# Patient Record
Sex: FEMALE | Race: WHITE | NOT HISPANIC OR LATINO | Employment: FULL TIME | URBAN - METROPOLITAN AREA
[De-identification: names, ages, dates, MRNs, and addresses within clinical notes are randomized per-mention and may not be internally consistent; named-entity substitution may affect disease eponyms.]

---

## 2018-01-13 NOTE — MISCELLANEOUS
Message  I called patient after receiving a referral from [facility] patient stated she is not interest in surgery and told them that could not understand why she was referred        Signatures   Electronically signed by : Krishna Krishnamurthy, ; Dec 28 2016  9:47AM EST                       (Author)

## 2022-05-24 ENCOUNTER — APPOINTMENT (EMERGENCY)
Dept: CT IMAGING | Facility: HOSPITAL | Age: 47
End: 2022-05-24
Payer: COMMERCIAL

## 2022-05-24 ENCOUNTER — HOSPITAL ENCOUNTER (EMERGENCY)
Facility: HOSPITAL | Age: 47
Discharge: HOME | End: 2022-05-24
Attending: EMERGENCY MEDICINE
Payer: COMMERCIAL

## 2022-05-24 VITALS
SYSTOLIC BLOOD PRESSURE: 129 MMHG | DIASTOLIC BLOOD PRESSURE: 62 MMHG | OXYGEN SATURATION: 97 % | WEIGHT: 293 LBS | TEMPERATURE: 99.2 F | RESPIRATION RATE: 18 BRPM | HEART RATE: 59 BPM

## 2022-05-24 DIAGNOSIS — R10.9 LEFT FLANK PAIN: Primary | ICD-10-CM

## 2022-05-24 LAB
ANION GAP SERPL CALCULATED.3IONS-SCNC: 12 MMOL/L (ref 4–13)
BASOPHILS # BLD MANUAL: 0 THOUSAND/UL (ref 0–0.1)
BASOPHILS NFR MAR MANUAL: 0 % (ref 0–1)
BILIRUB UR QL STRIP: NEGATIVE
BUN SERPL-MCNC: 15 MG/DL (ref 5–25)
CALCIUM SERPL-MCNC: 9.5 MG/DL (ref 8.3–10.1)
CHLORIDE SERPL-SCNC: 104 MMOL/L (ref 100–108)
CLARITY UR: CLEAR
CO2 SERPL-SCNC: 25 MMOL/L (ref 21–32)
COLOR UR: YELLOW
CREAT SERPL-MCNC: 0.8 MG/DL (ref 0.6–1.3)
EOSINOPHIL # BLD MANUAL: 0.14 THOUSAND/UL (ref 0–0.4)
EOSINOPHIL NFR BLD MANUAL: 1 % (ref 0–6)
ERYTHROCYTE [DISTWIDTH] IN BLOOD BY AUTOMATED COUNT: 14.9 % (ref 11.6–15.1)
EXT PREG TEST URINE: NEGATIVE
EXT. CONTROL ED NAV: NORMAL
GFR SERPL CREATININE-BSD FRML MDRD: 88 ML/MIN/1.73SQ M
GLUCOSE SERPL-MCNC: 108 MG/DL (ref 65–140)
GLUCOSE UR STRIP-MCNC: NEGATIVE MG/DL
HCT VFR BLD AUTO: 37.9 % (ref 34.8–46.1)
HGB BLD-MCNC: 12 G/DL (ref 11.5–15.4)
HGB UR QL STRIP.AUTO: NEGATIVE
KETONES UR STRIP-MCNC: NEGATIVE MG/DL
LEUKOCYTE ESTERASE UR QL STRIP: NEGATIVE
LYMPHOCYTES # BLD AUTO: 41 % (ref 14–44)
LYMPHOCYTES # BLD AUTO: 5.79 THOUSAND/UL (ref 0.6–4.47)
MCH RBC QN AUTO: 26 PG (ref 26.8–34.3)
MCHC RBC AUTO-ENTMCNC: 31.7 G/DL (ref 31.4–37.4)
MCV RBC AUTO: 82 FL (ref 82–98)
MONOCYTES # BLD AUTO: 1.27 THOUSAND/UL (ref 0–1.22)
MONOCYTES NFR BLD: 9 % (ref 4–12)
NEUTROPHILS # BLD MANUAL: 6.92 THOUSAND/UL (ref 1.85–7.62)
NEUTS SEG NFR BLD AUTO: 49 % (ref 43–75)
NITRITE UR QL STRIP: NEGATIVE
PH UR STRIP.AUTO: 5.5 [PH] (ref 4.5–8)
PLATELET # BLD AUTO: 386 THOUSANDS/UL (ref 149–390)
PLATELET BLD QL SMEAR: ADEQUATE
PMV BLD AUTO: 9.4 FL (ref 8.9–12.7)
POTASSIUM SERPL-SCNC: 3.5 MMOL/L (ref 3.5–5.3)
PROT UR STRIP-MCNC: NEGATIVE MG/DL
RBC # BLD AUTO: 4.62 MILLION/UL (ref 3.81–5.12)
RBC MORPH BLD: NORMAL
SODIUM SERPL-SCNC: 141 MMOL/L (ref 136–145)
SP GR UR STRIP.AUTO: >=1.03 (ref 1–1.03)
UROBILINOGEN UR QL STRIP.AUTO: 0.2 E.U./DL
WBC # BLD AUTO: 14.13 THOUSAND/UL (ref 4.31–10.16)

## 2022-05-24 PROCEDURE — 99284 EMERGENCY DEPT VISIT MOD MDM: CPT | Performed by: PHYSICIAN ASSISTANT

## 2022-05-24 PROCEDURE — 85007 BL SMEAR W/DIFF WBC COUNT: CPT | Performed by: PHYSICIAN ASSISTANT

## 2022-05-24 PROCEDURE — G1004 CDSM NDSC: HCPCS

## 2022-05-24 PROCEDURE — 74176 CT ABD & PELVIS W/O CONTRAST: CPT

## 2022-05-24 PROCEDURE — 36415 COLL VENOUS BLD VENIPUNCTURE: CPT | Performed by: PHYSICIAN ASSISTANT

## 2022-05-24 PROCEDURE — 81025 URINE PREGNANCY TEST: CPT | Performed by: PHYSICIAN ASSISTANT

## 2022-05-24 PROCEDURE — 85027 COMPLETE CBC AUTOMATED: CPT | Performed by: PHYSICIAN ASSISTANT

## 2022-05-24 PROCEDURE — 99284 EMERGENCY DEPT VISIT MOD MDM: CPT

## 2022-05-24 PROCEDURE — 85025 COMPLETE CBC W/AUTO DIFF WBC: CPT | Performed by: PHYSICIAN ASSISTANT

## 2022-05-24 PROCEDURE — 81003 URINALYSIS AUTO W/O SCOPE: CPT

## 2022-05-24 PROCEDURE — 80048 BASIC METABOLIC PNL TOTAL CA: CPT | Performed by: PHYSICIAN ASSISTANT

## 2022-05-24 PROCEDURE — 96374 THER/PROPH/DIAG INJ IV PUSH: CPT

## 2022-05-24 RX ORDER — LIDOCAINE 40 MG/G
CREAM TOPICAL AS NEEDED
Qty: 30 G | Refills: 0 | Status: SHIPPED | OUTPATIENT
Start: 2022-05-24

## 2022-05-24 RX ORDER — LIDOCAINE 50 MG/G
1 PATCH TOPICAL ONCE
Status: DISCONTINUED | OUTPATIENT
Start: 2022-05-24 | End: 2022-05-25 | Stop reason: HOSPADM

## 2022-05-24 RX ORDER — KETOROLAC TROMETHAMINE 30 MG/ML
30 INJECTION, SOLUTION INTRAMUSCULAR; INTRAVENOUS ONCE
Status: COMPLETED | OUTPATIENT
Start: 2022-05-24 | End: 2022-05-24

## 2022-05-24 RX ORDER — METHOCARBAMOL 500 MG/1
500 TABLET, FILM COATED ORAL 3 TIMES DAILY
Qty: 20 TABLET | Refills: 0 | Status: SHIPPED | OUTPATIENT
Start: 2022-05-24

## 2022-05-24 RX ADMIN — LIDOCAINE 1 PATCH: 50 PATCH CUTANEOUS at 20:43

## 2022-05-24 RX ADMIN — KETOROLAC TROMETHAMINE 30 MG: 30 INJECTION, SOLUTION INTRAMUSCULAR at 20:41

## 2022-05-24 NOTE — Clinical Note
Carol J Carlos was seen and treated in our emergency department on 5/24/2022  Diagnosis:     Ruben Walls  may return to work on return date  She may return on this date: 05/25/2022         If you have any questions or concerns, please don't hesitate to call        Debbie Mckeon PA-C    ______________________________           _______________          _______________  Hospital Representative                              Date                                Time

## 2022-05-24 NOTE — Clinical Note
Yola Quezada was seen and treated in our emergency department on 5/24/2022  Diagnosis:     Sanchez Andrews  may return to work on return date  She may return on this date: 05/27/2022         If you have any questions or concerns, please don't hesitate to call        Martin Fong PA-C    ______________________________           _______________          _______________  Hospital Representative                              Date                                Time

## 2022-05-25 NOTE — DISCHARGE INSTRUCTIONS
Continue all previously prescribed medications  Remove Lidoderm patch after 12 hours  After that can use lidocaine cream as prescribed as needed for pain  Take Robaxin as prescribed as needed for muscle spasms  Do not drive or operate heavy machinery while taking medication  Continue Tylenol   Motrin at home as needed for pain  Follow-up with PCP for monitoring of symptoms  Follow-up with Spine and Pain specialist for further evaluation of symptoms  Return to ED if symptoms worsen including increasing pain, numbness, tingling, weakness of the legs, loss of bowel or bladder function, numbness in the groin, pain with urination, blood in urine, difficulty urinating

## 2022-05-25 NOTE — ED NOTES
Patient unable to provide a urine sample  Patient voided but missed the specimen cup       Chantal Han RN  05/24/22 2038

## 2022-05-25 NOTE — ED PROVIDER NOTES
History  Chief Complaint   Patient presents with    Back Pain     L sided lower back pain  Usually has urge to go constantly but hasn't felt that way  Started this morning  Patient is a 53-year-old female with a past medical history of diabetes, chronic back pain with lumbar disc herniation and radiculopathy, asthma who presents with left flank pain that started this morning  Patient describes a constant aching, occasionally sharp pain in her left flank that does not radiate  She states this pain feels different than her typical low back pain and denies any known injury to the area, dysuria, hematuria, urinary frequency or urgency  Patient notes a history of mixed urge and stress incontinence and states she does not feels though she is urinating as much as she usually does  She denies any urinary hesitancy, difficulty urinating, incomplete emptying, loss of bowel or bladder function  Patient has not tried anything to help alleviate her symptoms  She states the pain is worse with any movement, bending over  Patient denies any associated no numbness, tingling, weakness of the legs, saddle anesthesia, history of spinal surgeries, IV drug use  Patient states she typically uses medical marijuana for pain control with her gabapentin for her low back pain and denies any change in her low back pain  Patient states she is otherwise in her usual state of health and denies any fevers, chills, diaphoresis, headaches, congestion, cough, shortness of breath, chest pain, abdominal pain, nausea, vomiting, diarrhea, constipation  None       Past Medical History:   Diagnosis Date    Asthma        History reviewed  No pertinent surgical history  History reviewed  No pertinent family history  I have reviewed and agree with the history as documented      E-Cigarette/Vaping     E-Cigarette/Vaping Substances     Social History     Tobacco Use    Smoking status: Former Smoker    Smokeless tobacco: Never Used Substance Use Topics    Alcohol use: Yes     Comment: occ    Drug use: Yes     Types: Marijuana     Comment: med card       Review of Systems   Constitutional: Negative for chills, diaphoresis and fever  HENT: Negative for congestion  Respiratory: Negative for cough, shortness of breath, wheezing and stridor  Cardiovascular: Negative for chest pain, palpitations and leg swelling  Gastrointestinal: Negative for abdominal pain, diarrhea, nausea and vomiting  Genitourinary: Positive for decreased urine volume and flank pain  Negative for difficulty urinating, dysuria, frequency, hematuria, pelvic pain, urgency, vaginal bleeding and vaginal discharge  Musculoskeletal: Positive for back pain (Chronic, unchanged)  Negative for myalgias, neck pain and neck stiffness  Skin: Negative for color change, pallor and rash  Neurological: Negative for dizziness, weakness, light-headedness, numbness and headaches  All other systems reviewed and are negative  Physical Exam  Physical Exam  Vitals and nursing note reviewed  Constitutional:       General: She is awake  She is not in acute distress  Appearance: She is well-developed  She is not ill-appearing, toxic-appearing or diaphoretic  HENT:      Head: Normocephalic and atraumatic  Right Ear: External ear normal       Left Ear: External ear normal       Nose: Nose normal    Eyes:      Conjunctiva/sclera: Conjunctivae normal       Pupils: Pupils are equal, round, and reactive to light  Cardiovascular:      Rate and Rhythm: Normal rate and regular rhythm  Pulses: Normal pulses  Heart sounds: Normal heart sounds, S1 normal and S2 normal    Pulmonary:      Effort: Pulmonary effort is normal  No respiratory distress  Breath sounds: Normal breath sounds  No stridor  No decreased breath sounds or wheezing  Abdominal:      General: Bowel sounds are normal  There is no distension  Palpations: Abdomen is soft        Tenderness: There is no abdominal tenderness  There is left CVA tenderness  There is no right CVA tenderness, guarding or rebound  Musculoskeletal:         General: Normal range of motion  Cervical back: Normal, normal range of motion and neck supple  Thoracic back: Tenderness present  No swelling, edema, deformity, lacerations or bony tenderness  Lumbar back: Tenderness present  No swelling, deformity or bony tenderness  Normal range of motion  Back:       Comments: Neurovascularly intact, 5/5 strength in bilateral lower extremities  Patient ambulating without issue  Baseline tenderness to palpation and lumbar region  No midline tenderness, step-off deformity, swelling, erythema, skin changes noted of the back  Tenderness to palpation of the left CVA and just medial in the musculature  2+ patellar reflexes bilaterally  Skin:     General: Skin is warm and dry  Capillary Refill: Capillary refill takes less than 2 seconds  Neurological:      Mental Status: She is alert and oriented to person, place, and time  Deep Tendon Reflexes:      Reflex Scores:       Patellar reflexes are 2+ on the right side and 2+ on the left side  Psychiatric:         Behavior: Behavior is cooperative           Vital Signs  ED Triage Vitals [05/24/22 1914]   Temperature Pulse Respirations Blood Pressure SpO2   99 2 °F (37 3 °C) 100 20 125/73 100 %      Temp Source Heart Rate Source Patient Position - Orthostatic VS BP Location FiO2 (%)   Oral Monitor Sitting Right arm --      Pain Score       5           Vitals:    05/24/22 1914 05/24/22 2111 05/24/22 2318   BP: 125/73 126/73 129/62   Pulse: 100 74 59   Patient Position - Orthostatic VS: Sitting Sitting Lying         Visual Acuity      ED Medications  Medications   lidocaine (LIDODERM) 5 % patch 1 patch (1 patch Topical Medication Applied 5/24/22 2043)   ketorolac (TORADOL) injection 30 mg (30 mg Intravenous Given 5/24/22 2041)       Diagnostic Studies  Results Reviewed     Procedure Component Value Units Date/Time    POCT pregnancy, urine [[id number]]  (Normal) Resulted: 05/24/22 2123    Lab Status: Final result Updated: 05/24/22 2123     EXT PREG TEST UR (Ref: Negative) NEGATIVE     Control VALID    Urine Macroscopic, POC [[id number]] Collected: 05/24/22 2120    Lab Status: Final result Specimen: Urine Updated: 05/24/22 2122     Color, UA Yellow     Clarity, UA Clear     pH, UA 5 5     Leukocytes, UA Negative     Nitrite, UA Negative     Protein, UA Negative mg/dl      Glucose, UA Negative mg/dl      Ketones, UA Negative mg/dl      Urobilinogen, UA 0 2 E U /dl      Bilirubin, UA Negative     Blood, UA Negative     Specific Gravity, UA >=1 030    Narrative:      CLINITEK RESULT    Manual Differential(PHLEBS Do Not Order) [[id number]]  (Abnormal) Collected: 05/24/22 2029    Lab Status: Final result Specimen: Blood from Arm, Right Updated: 05/24/22 2115     Segmented % 49 %      Lymphocytes % 41 %      Monocytes % 9 %      Eosinophils, % 1 %      Basophils % 0 %      Absolute Neutrophils 6 92 Thousand/uL      Lymphocytes Absolute 5 79 Thousand/uL      Monocytes Absolute 1 27 Thousand/uL      Eosinophils Absolute 0 14 Thousand/uL      Basophils Absolute 0 00 Thousand/uL      Total Counted --     RBC Morphology Normal     Platelet Estimate Adequate    Basic metabolic panel [[id number]] Collected: 05/24/22 2029    Lab Status: Final result Specimen: Blood from Arm, Right Updated: 05/24/22 2056     Sodium 141 mmol/L      Potassium 3 5 mmol/L      Chloride 104 mmol/L      CO2 25 mmol/L      ANION GAP 12 mmol/L      BUN 15 mg/dL      Creatinine 0 80 mg/dL      Glucose 108 mg/dL      Calcium 9 5 mg/dL      eGFR 88 ml/min/1 73sq m     Narrative:      [place] guidelines for Chronic Kidney Disease (CKD):     Stage 1 with normal or high GFR (GFR > 90 mL/min/1 73 square meters)    Stage 2 Mild CKD (GFR = 60-89 mL/min/1 73 square meters)    Stage 3A Moderate CKD (GFR = 45-59 mL/min/1 73 square meters)    Stage 3B Moderate CKD (GFR = 30-44 mL/min/1 73 square meters)    Stage 4 Severe CKD (GFR = 15-29 mL/min/1 73 square meters)    Stage 5 End Stage CKD (GFR <15 mL/min/1 73 square meters)  Note: GFR calculation is accurate only with a steady state creatinine    CBC and differential [[id number]]  (Abnormal) Collected: 05/24/22 2029    Lab Status: Final result Specimen: Blood from Arm, Right Updated: 05/24/22 2049     WBC 14 13 Thousand/uL      RBC 4 62 Million/uL      Hemoglobin 12 0 g/dL      Hematocrit 37 9 %      MCV 82 fL      MCH 26 0 pg      MCHC 31 7 g/dL      RDW 14 9 %      MPV 9 4 fL      Platelets 251 Thousands/uL     Narrative: This is an appended report  These results have been appended to a previously verified report  CT renal stone study abdomen pelvis without contrast   Final Result by Catalina Murphy MD (05/24 2236)         1  No evidence of nephrolithiasis or obstructive uropathy  2   No evidence of bowel obstruction, colitis or diverticulitis  Workstation performed: [id number]                    Procedures  Procedures         ED Course  ED Course as of 05/24/22 2348   Tue May 24, 2022   2049 WBC(!): 14 13   2250 CT renal stone study abdomen pelvis without contrast  IMPRESSION:        1  No evidence of nephrolithiasis or obstructive uropathy  2   No evidence of bowel obstruction, colitis or diverticulitis   2300 Nurse confirmed PVR bladder volume 42mL   2318 Patient noted improvement of symptoms  Reviewed all results with patient, answered questions  Patient stable for discharge  SBIRT 22yo+    Flowsheet Row Most Recent Value   SBIRT (23 yo +)    In order to provide better care to our patients, we are screening all of our patients for alcohol and drug use  Would it be okay to ask you these screening questions?  No Filed at: 05/24/2022 1944                    MDM  Number of Diagnoses or Management Options  Left flank pain  Diagnosis management comments: Reviewed all results with patient, answered questions  Patient noted improvement of symptoms  Reviewed medication education, treatment at home  Recommended follow-up with Spine and Pain specialist for further evaluation and monitoring of symptoms  Recommended follow-up with PCP for monitoring of symptoms  The management plan was discussed in detail with the patient at bedside and all questions were answered  Provided both verbal and written instructions  Reviewed red flag symptoms and strict return to ED instructions  Patient notes understanding and agrees to plan  Disposition  Final diagnoses:   Left flank pain     Time reflects when diagnosis was documented in both MDM as applicable and the Disposition within this note     Time User Action Codes Description Comment    5/24/2022 11:18 PM Martin, [address] [R10 9] Left flank pain       ED Disposition     ED Disposition   Discharge    Condition   Stable    Date/Time   Tue May 24, 2022 11:18 PM    Comment   Selena Coles discharge to home/self care                 Follow-up Information     Follow up With Specialties Details Why [address] [facility] Emergency Department Emergency Medicine  If symptoms worsen [facility] [id number]  [address] [facility] Emergency Department, [address] [place] [address], [id number]    Follow-up with Spine and Pain specialist for further evaluation of symptoms         Follow-up with PCP as needed for monitoring of symptoms         [facility]   [address], [address] [facility] [id number]  [address], [address], [address] [place], [place], [address]          Discharge Medication List as of 5/24/2022 11:21 PM      START taking these medications    Details   lidocaine (LMX) 4 % cream Apply topically as needed for mild pain, Starting Tue 5/24/2022, Normal      methocarbamol (ROBAXIN) 500 mg tablet Take 1 tablet (500 mg total) by mouth in the morning and 1 tablet (500 mg total) in the evening and 1 tablet (500 mg total) before bedtime  , Starting Tue 5/24/2022, Normal             No discharge procedures on file      PDMP Review     None          ED Provider  Electronically Signed by           Darshana Ohara PA-C  05/24/22 0905